# Patient Record
Sex: FEMALE | Race: WHITE | Employment: OTHER | URBAN - METROPOLITAN AREA
[De-identification: names, ages, dates, MRNs, and addresses within clinical notes are randomized per-mention and may not be internally consistent; named-entity substitution may affect disease eponyms.]

---

## 2017-06-20 ENCOUNTER — HOSPITAL ENCOUNTER (OUTPATIENT)
Dept: GENERAL RADIOLOGY | Age: 82
Discharge: HOME | End: 2017-06-20
Attending: INTERNAL MEDICINE | Admitting: INTERNAL MEDICINE

## 2017-06-20 ENCOUNTER — OFFICE VISIT (OUTPATIENT)
Age: 82
End: 2017-06-20

## 2017-06-20 VITALS
WEIGHT: 118.6 LBS | BODY MASS INDEX: 19.76 KG/M2 | DIASTOLIC BLOOD PRESSURE: 68 MMHG | SYSTOLIC BLOOD PRESSURE: 116 MMHG | HEIGHT: 65 IN

## 2017-06-20 DIAGNOSIS — M81.0 OSTEOPOROSIS, POSTMENOPAUSAL: ICD-10-CM

## 2017-06-20 DIAGNOSIS — M81.0 OSTEOPOROSIS, POSTMENOPAUSAL: Primary | ICD-10-CM

## 2017-06-20 DIAGNOSIS — Z51.81 MEDICATION MONITORING ENCOUNTER: ICD-10-CM

## 2017-06-20 PROCEDURE — 96372 THER/PROPH/DIAG INJ SC/IM: CPT | Performed by: INTERNAL MEDICINE

## 2017-06-20 PROCEDURE — 1090F PRES/ABSN URINE INCON ASSESS: CPT | Performed by: INTERNAL MEDICINE

## 2017-06-20 PROCEDURE — 99214 OFFICE O/P EST MOD 30 MIN: CPT | Performed by: INTERNAL MEDICINE

## 2017-06-20 PROCEDURE — G8427 DOCREV CUR MEDS BY ELIG CLIN: HCPCS | Performed by: INTERNAL MEDICINE

## 2017-06-20 PROCEDURE — G8399 PT W/DXA RESULTS DOCUMENT: HCPCS | Performed by: INTERNAL MEDICINE

## 2017-06-20 PROCEDURE — 77080 DXA BONE DENSITY AXIAL: CPT | Performed by: INTERNAL MEDICINE

## 2017-06-20 PROCEDURE — 1036F TOBACCO NON-USER: CPT | Performed by: INTERNAL MEDICINE

## 2017-06-20 PROCEDURE — 4005F PHARM THX FOR OP RXD: CPT | Performed by: INTERNAL MEDICINE

## 2017-06-20 PROCEDURE — 4040F PNEUMOC VAC/ADMIN/RCVD: CPT | Performed by: INTERNAL MEDICINE

## 2017-06-20 PROCEDURE — G8420 CALC BMI NORM PARAMETERS: HCPCS | Performed by: INTERNAL MEDICINE

## 2017-06-20 PROCEDURE — 1123F ACP DISCUSS/DSCN MKR DOCD: CPT | Performed by: INTERNAL MEDICINE

## 2017-06-26 ENCOUNTER — PROCEDURE VISIT (OUTPATIENT)
Age: 82
End: 2017-06-26

## 2017-06-26 DIAGNOSIS — M81.0 OSTEOPOROSIS, POSTMENOPAUSAL: Primary | ICD-10-CM

## 2017-07-11 ENCOUNTER — HOSPITAL ENCOUNTER (OUTPATIENT)
Dept: GENERAL RADIOLOGY | Age: 82
Discharge: HOME | End: 2017-07-11

## 2017-07-11 LAB
ALBUMIN SERPL-MCNC: 4.8 G/DL (ref 3.4–5)
ANION GAP SERPL CALCULATED.3IONS-SCNC: 11 MMOL/L (ref 3–16)
BUN BLDV-MCNC: 15 MG/DL (ref 7–20)
CALCIUM SERPL-MCNC: 9.2 MG/DL (ref 8.3–10.6)
CHLORIDE BLD-SCNC: 93 MMOL/L (ref 99–110)
CO2: 27 MMOL/L (ref 21–32)
CREAT SERPL-MCNC: <0.5 MG/DL (ref 0.6–1.2)
GFR AFRICAN AMERICAN: >60
GFR NON-AFRICAN AMERICAN: >60
GLUCOSE BLD-MCNC: 83 MG/DL (ref 70–99)
PHOSPHORUS: 3.8 MG/DL (ref 2.5–4.9)
POTASSIUM SERPL-SCNC: 4 MMOL/L (ref 3.5–5.1)
SODIUM BLD-SCNC: 131 MMOL/L (ref 136–145)
VITAMIN D 25-HYDROXY: 44.1 NG/ML

## 2017-12-15 DIAGNOSIS — M81.0 OSTEOPOROSIS, POSTMENOPAUSAL: Primary | ICD-10-CM

## 2017-12-21 ENCOUNTER — NURSE ONLY (OUTPATIENT)
Age: 82
End: 2017-12-21

## 2017-12-21 DIAGNOSIS — M81.0 POSTMENOPAUSAL OSTEOPOROSIS: Primary | ICD-10-CM

## 2017-12-21 PROCEDURE — 96372 THER/PROPH/DIAG INJ SC/IM: CPT | Performed by: INTERNAL MEDICINE

## 2018-06-20 DIAGNOSIS — M81.0 OSTEOPOROSIS, POSTMENOPAUSAL: Primary | ICD-10-CM

## 2018-06-27 ENCOUNTER — NURSE ONLY (OUTPATIENT)
Age: 83
End: 2018-06-27

## 2018-06-27 DIAGNOSIS — M81.0 OSTEOPOROSIS, POSTMENOPAUSAL: Primary | ICD-10-CM

## 2018-06-27 PROCEDURE — 96372 THER/PROPH/DIAG INJ SC/IM: CPT | Performed by: INTERNAL MEDICINE

## 2018-06-27 PROCEDURE — 99999 PR OFFICE/OUTPT VISIT,PROCEDURE ONLY: CPT | Performed by: INTERNAL MEDICINE

## 2019-01-14 ENCOUNTER — OFFICE VISIT (OUTPATIENT)
Dept: ENDOCRINOLOGY | Age: 84
End: 2019-01-14
Payer: MEDICARE

## 2019-01-14 ENCOUNTER — PROCEDURE VISIT (OUTPATIENT)
Dept: ENDOCRINOLOGY | Age: 84
End: 2019-01-14
Payer: MEDICARE

## 2019-01-14 ENCOUNTER — HOSPITAL ENCOUNTER (OUTPATIENT)
Dept: GENERAL RADIOLOGY | Age: 84
Discharge: HOME | End: 2019-01-14
Payer: MEDICARE

## 2019-01-14 VITALS
DIASTOLIC BLOOD PRESSURE: 71 MMHG | SYSTOLIC BLOOD PRESSURE: 136 MMHG | HEIGHT: 65 IN | BODY MASS INDEX: 19.56 KG/M2 | WEIGHT: 117.4 LBS

## 2019-01-14 DIAGNOSIS — M81.0 OSTEOPOROSIS, POSTMENOPAUSAL: Primary | ICD-10-CM

## 2019-01-14 DIAGNOSIS — M81.0 OSTEOPOROSIS, POSTMENOPAUSAL: ICD-10-CM

## 2019-01-14 DIAGNOSIS — Z51.81 MEDICATION MONITORING ENCOUNTER: ICD-10-CM

## 2019-01-14 PROCEDURE — G8399 PT W/DXA RESULTS DOCUMENT: HCPCS | Performed by: INTERNAL MEDICINE

## 2019-01-14 PROCEDURE — G8427 DOCREV CUR MEDS BY ELIG CLIN: HCPCS | Performed by: INTERNAL MEDICINE

## 2019-01-14 PROCEDURE — 1036F TOBACCO NON-USER: CPT | Performed by: INTERNAL MEDICINE

## 2019-01-14 PROCEDURE — 99214 OFFICE O/P EST MOD 30 MIN: CPT | Performed by: INTERNAL MEDICINE

## 2019-01-14 PROCEDURE — G8484 FLU IMMUNIZE NO ADMIN: HCPCS | Performed by: INTERNAL MEDICINE

## 2019-01-14 PROCEDURE — 4040F PNEUMOC VAC/ADMIN/RCVD: CPT | Performed by: INTERNAL MEDICINE

## 2019-01-14 PROCEDURE — G8420 CALC BMI NORM PARAMETERS: HCPCS | Performed by: INTERNAL MEDICINE

## 2019-01-14 PROCEDURE — 1123F ACP DISCUSS/DSCN MKR DOCD: CPT | Performed by: INTERNAL MEDICINE

## 2019-01-14 PROCEDURE — 77080 DXA BONE DENSITY AXIAL: CPT | Performed by: INTERNAL MEDICINE

## 2019-01-14 PROCEDURE — 96372 THER/PROPH/DIAG INJ SC/IM: CPT | Performed by: INTERNAL MEDICINE

## 2019-01-14 PROCEDURE — 1101F PT FALLS ASSESS-DOCD LE1/YR: CPT | Performed by: INTERNAL MEDICINE

## 2019-01-14 PROCEDURE — 1090F PRES/ABSN URINE INCON ASSESS: CPT | Performed by: INTERNAL MEDICINE

## 2019-01-14 PROCEDURE — 77080 DXA BONE DENSITY AXIAL: CPT

## 2019-06-04 ENCOUNTER — TELEPHONE (OUTPATIENT)
Dept: ENDOCRINOLOGY | Age: 84
End: 2019-06-04

## 2019-06-04 NOTE — TELEPHONE ENCOUNTER
Patient called to confirm that this office wanted to know her  date of birth.  Nurse explained to patient that his  was required so that the office could verify her insurance for the prolia coverage

## 2019-07-17 ENCOUNTER — NURSE ONLY (OUTPATIENT)
Dept: ENDOCRINOLOGY | Age: 84
End: 2019-07-17
Payer: MEDICARE

## 2019-07-17 DIAGNOSIS — M81.0 OSTEOPOROSIS, POSTMENOPAUSAL: ICD-10-CM

## 2019-07-17 PROCEDURE — 96372 THER/PROPH/DIAG INJ SC/IM: CPT | Performed by: INTERNAL MEDICINE

## 2020-01-21 ENCOUNTER — OFFICE VISIT (OUTPATIENT)
Dept: ENDOCRINOLOGY | Age: 85
End: 2020-01-21
Payer: MEDICARE

## 2020-01-21 ENCOUNTER — PROCEDURE VISIT (OUTPATIENT)
Dept: ENDOCRINOLOGY | Age: 85
End: 2020-01-21
Payer: MEDICARE

## 2020-01-21 ENCOUNTER — HOSPITAL ENCOUNTER (OUTPATIENT)
Dept: GENERAL RADIOLOGY | Age: 85
Discharge: HOME | End: 2020-01-21
Payer: MEDICARE

## 2020-01-21 VITALS
SYSTOLIC BLOOD PRESSURE: 121 MMHG | BODY MASS INDEX: 20.16 KG/M2 | WEIGHT: 121 LBS | HEIGHT: 65 IN | DIASTOLIC BLOOD PRESSURE: 69 MMHG

## 2020-01-21 DIAGNOSIS — M81.0 OSTEOPOROSIS, POSTMENOPAUSAL: ICD-10-CM

## 2020-01-21 LAB
ALBUMIN SERPL-MCNC: 4.5 G/DL (ref 3.4–5)
ANION GAP SERPL CALCULATED.3IONS-SCNC: 14 MMOL/L (ref 3–16)
BUN BLDV-MCNC: 11 MG/DL (ref 7–20)
CALCIUM SERPL-MCNC: 9.4 MG/DL (ref 8.3–10.6)
CHLORIDE BLD-SCNC: 94 MMOL/L (ref 99–110)
CO2: 26 MMOL/L (ref 21–32)
CREAT SERPL-MCNC: <0.5 MG/DL (ref 0.6–1.2)
GFR AFRICAN AMERICAN: >60
GFR NON-AFRICAN AMERICAN: >60
GLUCOSE BLD-MCNC: 83 MG/DL (ref 70–99)
PHOSPHORUS: 4.3 MG/DL (ref 2.5–4.9)
POTASSIUM SERPL-SCNC: 4.4 MMOL/L (ref 3.5–5.1)
SODIUM BLD-SCNC: 134 MMOL/L (ref 136–145)

## 2020-01-21 PROCEDURE — 96372 THER/PROPH/DIAG INJ SC/IM: CPT | Performed by: INTERNAL MEDICINE

## 2020-01-21 PROCEDURE — G8484 FLU IMMUNIZE NO ADMIN: HCPCS | Performed by: INTERNAL MEDICINE

## 2020-01-21 PROCEDURE — 99214 OFFICE O/P EST MOD 30 MIN: CPT | Performed by: INTERNAL MEDICINE

## 2020-01-21 PROCEDURE — G8427 DOCREV CUR MEDS BY ELIG CLIN: HCPCS | Performed by: INTERNAL MEDICINE

## 2020-01-21 PROCEDURE — 1123F ACP DISCUSS/DSCN MKR DOCD: CPT | Performed by: INTERNAL MEDICINE

## 2020-01-21 PROCEDURE — G8420 CALC BMI NORM PARAMETERS: HCPCS | Performed by: INTERNAL MEDICINE

## 2020-01-21 PROCEDURE — 1090F PRES/ABSN URINE INCON ASSESS: CPT | Performed by: INTERNAL MEDICINE

## 2020-01-21 PROCEDURE — 77080 DXA BONE DENSITY AXIAL: CPT

## 2020-01-21 PROCEDURE — 4040F PNEUMOC VAC/ADMIN/RCVD: CPT | Performed by: INTERNAL MEDICINE

## 2020-01-21 PROCEDURE — G8399 PT W/DXA RESULTS DOCUMENT: HCPCS | Performed by: INTERNAL MEDICINE

## 2020-01-21 PROCEDURE — 77080 DXA BONE DENSITY AXIAL: CPT | Performed by: INTERNAL MEDICINE

## 2020-01-21 PROCEDURE — 1036F TOBACCO NON-USER: CPT | Performed by: INTERNAL MEDICINE

## 2020-01-21 NOTE — PROGRESS NOTES
[facility] ([facility]) Osteoporosis and [address]  [address]  [address], [address]  Phone [phone]  Fax [phone]    NAME:  Ida Cortés  :  1935  CONSULT DATE:    2015  MOST RECENT VISIT:  2019  TODAYS DATE:  2020    Labs @ [facility] 2017     PROBLEMS. Normal BMD by DXA 2002, lowest T-score -0.9 in the spine (L1-L2)    BMD decreased 5222-5636-5972-2012, 7233-0122, lowest T-score -4.0 in the spine (L1-L2)  Natural menopause age 63 (uterus removed)  Back pain - surgery  relieved the pain  GERD, no symptoms, no meds (sleeps with head up)    CURRENT MANAGEMENT FOR OSTEOPOROSIS. Calcium, 300 mg from low calcium foods,  450 mg rice milk, 300 mg cheese, yogurt most days   diet MVI Ca+D other total    Calcium 1350+ 500-1000   8710-4598+ mg/d   Vitamin D  5098-9199   2728-6806 IU/d     25-OH D 44 ng/mL 2017 (desirable is 30-60 ng/mL)  Exercise, DVD for arthritis 1 hr each day  Pharmacologic therapy: Prolia 60 mg SQ twice yearly started 2015    PREVIOUS MEDICATIONS FOR OSTEOPOROSIS. Premarin off and on after menopause    OTHER CURRENT MEDICATIONS (SELECTED): clonazepam  OTC MEDICATIONS (SELECTED): Tylenol    CHIEF COMPLAINT. Here for f/u of osteoporosis, monitoring treatment. No new related signs or symptoms. INTERVAL HISTORY. See problem list for chronic/inactive conditions. She received Prolia without side effects. No falls, near-falls or fractures. Feels well overall. FOR FULL DETAILS OF FAMILY HISTORY, PAST MEDICAL AND SURGICAL HISTORY, SOCIAL HISTORY, AND REVIEW OF SYSTEMS, SEE PATIENT QUESTIONNAIRE OF TODAYS DATE. PHYSICAL EXAMINATION. GENERAL. Well-nourished, well-developed, normally proportioned adult. MENTAL STATUS. Pleasant mood. Oriented to time, place, and person. ORAL. Teeth appear to be in good condition. MUSCULOSKELETAL. Spinal contours are normal.  No spine tenderness to palpation or percussion.    Three for counseling and care coordination details. Cecil Kim MD, Director, [facility]) Osteoporosis and Bone Health Services    CC: Rehan Hung MD

## 2020-01-21 NOTE — RESULT ENCOUNTER NOTE
[facility]) Osteoporosis and [address] [place] [address]   Phone [phone]  Fax [phone]    NAME: Katie Menon   : 1935   STUDY DATE: 2020     REFERRING PROVIDER: Dennis España MD    INDICATION(S) FOR PERFORMING THE STUDY:  osteoporosis, age related (M81.0)    CLINICAL INFORMATION PROVIDED BY THE PATIENT: 82-year-old woman. Natural menopause was ae 62. No history of fragility fractures. No long-term corticosteroid use. Current treatment is Prolia started 2016. EQUIPMENT: Hologic Discovery. POSITIONING: Good. REGIONS OF INTEREST: Correct. ARTIFACTS: None. STUDY VALID? Yes. 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 T-scores   Initial study: 2002 L1-L2 -0.9 left fem. neck -0.7   Current study: 2020 L1-L2 -0.9 left fem. neck -1.9     The table below shows bone mineral density (grams/cm2), the appropriate measure for comparing serial scans. An increase or decrease is significant based on precision studies done at our center according to the ISCD protocol. PA spine Proximal Femur (left)   Date L1-L2 Fem. neck Trochanter Total hip   2002 0.880 0.773 0.703 0.992   2008 0.737 0.698 0.642 0.877   2012 0.691 0.662 0.559 0.813   2014 0.535 0.645 0.517 0.762   05/10/2016 0.772 0.601 0.544 0.745   2017 0.769 0.601 0.542 0.759   2019 0.833 0.625 0.557 0.747   2020 0.875 0.642 0.578 0.761     IMPRESSION:  BONE DENSITY IS LOW, CONSISTENT WITH OSTEOPOROSIS. SINCE THE PREVIOUS DXA, BMD DID INCREASED IN THE SPINE AND IS TRENDING UP IN THE LEFT HIP. Consider repeating this study in 1-2 years to assess the patient's progress. _________________________________________________   West Kim MD, Director, [facility] ([facility]) Osteoporosis and [address]

## 2020-07-23 ENCOUNTER — NURSE ONLY (OUTPATIENT)
Dept: ENDOCRINOLOGY | Age: 85
End: 2020-07-23
Payer: MEDICARE

## 2020-07-23 PROCEDURE — 96372 THER/PROPH/DIAG INJ SC/IM: CPT | Performed by: INTERNAL MEDICINE

## 2021-01-26 ENCOUNTER — OFFICE VISIT (OUTPATIENT)
Dept: ENDOCRINOLOGY | Age: 86
End: 2021-01-26
Payer: MEDICARE

## 2021-01-26 ENCOUNTER — PROCEDURE VISIT (OUTPATIENT)
Dept: ENDOCRINOLOGY | Age: 86
End: 2021-01-26

## 2021-01-26 ENCOUNTER — HOSPITAL ENCOUNTER (OUTPATIENT)
Dept: GENERAL RADIOLOGY | Age: 86
Discharge: HOME | End: 2021-01-26
Payer: MEDICARE

## 2021-01-26 VITALS
BODY MASS INDEX: 19.99 KG/M2 | DIASTOLIC BLOOD PRESSURE: 78 MMHG | HEIGHT: 65 IN | SYSTOLIC BLOOD PRESSURE: 138 MMHG | WEIGHT: 120 LBS

## 2021-01-26 DIAGNOSIS — Z51.81 MEDICATION MONITORING ENCOUNTER: ICD-10-CM

## 2021-01-26 DIAGNOSIS — M81.0 OSTEOPOROSIS, POSTMENOPAUSAL: Primary | ICD-10-CM

## 2021-01-26 DIAGNOSIS — M81.0 OSTEOPOROSIS, POSTMENOPAUSAL: ICD-10-CM

## 2021-01-26 PROCEDURE — 1036F TOBACCO NON-USER: CPT | Performed by: INTERNAL MEDICINE

## 2021-01-26 PROCEDURE — 77080 DXA BONE DENSITY AXIAL: CPT | Performed by: INTERNAL MEDICINE

## 2021-01-26 PROCEDURE — G8427 DOCREV CUR MEDS BY ELIG CLIN: HCPCS | Performed by: INTERNAL MEDICINE

## 2021-01-26 PROCEDURE — 1090F PRES/ABSN URINE INCON ASSESS: CPT | Performed by: INTERNAL MEDICINE

## 2021-01-26 PROCEDURE — 1123F ACP DISCUSS/DSCN MKR DOCD: CPT | Performed by: INTERNAL MEDICINE

## 2021-01-26 PROCEDURE — G8420 CALC BMI NORM PARAMETERS: HCPCS | Performed by: INTERNAL MEDICINE

## 2021-01-26 PROCEDURE — 4040F PNEUMOC VAC/ADMIN/RCVD: CPT | Performed by: INTERNAL MEDICINE

## 2021-01-26 PROCEDURE — 99215 OFFICE O/P EST HI 40 MIN: CPT | Performed by: INTERNAL MEDICINE

## 2021-01-26 PROCEDURE — 77080 DXA BONE DENSITY AXIAL: CPT

## 2021-01-26 PROCEDURE — G8399 PT W/DXA RESULTS DOCUMENT: HCPCS | Performed by: INTERNAL MEDICINE

## 2021-01-26 PROCEDURE — G8484 FLU IMMUNIZE NO ADMIN: HCPCS | Performed by: INTERNAL MEDICINE

## 2021-01-26 PROCEDURE — 96372 THER/PROPH/DIAG INJ SC/IM: CPT | Performed by: INTERNAL MEDICINE

## 2021-01-26 RX ORDER — PROPRANOLOL HYDROCHLORIDE 10 MG/1
10 TABLET ORAL EVERY 12 HOURS
COMMUNITY
Start: 2020-11-17

## 2021-01-26 RX ORDER — ASPIRIN 325 MG
325 TABLET ORAL DAILY
COMMUNITY

## 2021-01-26 NOTE — RESULT ENCOUNTER NOTE
[facility]) Osteoporosis and [address] [place] [address]., [address] [place] [address]   Phone [phone]  Fax [phone]    NAME: Davy Robertson   : 1935   STUDY DATE: 2021     REFERRING PROVIDER: Kelley Hernandez MD    INDICATION(S) FOR PERFORMING THE STUDY:  osteoporosis, age related (M81.0)    CLINICAL INFORMATION PROVIDED BY THE PATIENT: 21-year-old woman. Natural menopause was ae 62. No history of fragility fractures. No long-term corticosteroid use. Current treatment is Prolia started 2016. EQUIPMENT: Hologic Discovery. POSITIONING: Good. REGIONS OF INTEREST: Correct. ARTIFACTS: None. STUDY VALID? Yes. 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 T-scores   Initial study: 2002 L1-L2 -0.9 left fem. neck -0.7   Current study: 2021 L1-L2 -0.7 left fem. neck -1.9     The table below shows bone mineral density (grams/cm2), the appropriate measure for comparing serial scans. An increase or decrease is significant based on precision studies done at our center according to the ISCD protocol. PA spine Proximal Femur (left)   Date L1-L2 Fem. neck Trochanter Total hip   2002 0.880 0.773 0.703 0.992   2008 0.737 0.698 0.642 0.877   2012 0.691 0.662 0.559 0.813   2014 0.535 0.645 0.517 0.762   05/10/2016 0.772 0.601 0.544 0.745   2017 0.769 0.601 0.542 0.759   2019 0.833 0.625 0.557 0.747   2020 0.875 0.642 0.578 0.761   2021 0.901 0.634 0.562 0.742     IMPRESSION:  BONE DENSITY IS LOW, CONSISTENT WITH OSTEOPOROSIS. SINCE THE LAST DXA, BMD DID NOT CHANGE SIGNIFICANTLY IN THE SPINE OR LEFT HIP.  COMPARED WITH 2014, BEFORE STARTING PROLIA, BMD IS HIGHER NOW IN THE SPINE AND TROCHANTER. Consider repeating this study in 1-2 years to assess the patient's progress. _________________________________________________   Johnny Walker MD,

## 2021-01-26 NOTE — PROGRESS NOTES
[facility] ([facility]) Osteoporosis and [address]  [address]  [place], [address]  Phone [phone]  Fax [phone]    NAME:  Poncho Oliveira  :  1935  CONSULT DATE:    2015  MOST RECENT VISIT:  2020  TODAYS DATE:  2021    Labs @ [facility] 2020    PROBLEMS. Normal BMD by DXA 2002, lowest T-score -0.9 in the spine (L1-L2)    BMD decreased 8864-5640-2843-2012, 1095-2109, lowest T-score -4.0 in the spine (L1-L2)  Natural menopause age 63 (uterus removed)  Back pain - surgery  relieved the pain  GERD, no symptoms, no meds (sleeps with head up)  2020 syncope    CURRENT MANAGEMENT FOR OSTEOPOROSIS. Calcium, 300 mg from low calcium foods,  450 mg rice milk, 300 mg cheese, yogurt most days   diet MVI Ca+D other total    Calcium 1350+ 500-1000   2876-2650+ mg/d   Vitamin D  7641-0410   5444-7644 IU/d     25-OH D 44 ng/mL 2017 (desirable is 30-60 ng/mL)  Exercise, DVD for arthritis 1 hr each day  Pharmacologic therapy: Prolia 60 mg SQ twice yearly started 2015    PREVIOUS MEDICATIONS FOR OSTEOPOROSIS. Premarin off and on after menopause    OTHER CURRENT MEDICATIONS (SELECTED): clonazepam, propranolol  OTC MEDICATIONS (SELECTED): Tylenol    CHIEF COMPLAINT. Here for f/u of osteoporosis, monitoring treatment. No new related signs or symptoms. INTERVAL HISTORY. See problem list for chronic/inactive conditions. She received Prolia without side effects. No falls, near-falls or fractures. 2020 she blacked out, was hospitalized with no cause found. She was given metoprolol which was too strong then changed to propranolol which she continues to take. FOR FULL DETAILS OF FAMILY HISTORY, PAST MEDICAL AND SURGICAL HISTORY, SOCIAL HISTORY, AND REVIEW OF SYSTEMS, SEE PATIENT QUESTIONNAIRE OF TODAYS DATE. PHYSICAL EXAMINATION. GENERAL. Well-nourished, well-developed, normally proportioned adult. MENTAL STATUS. Pleasant mood.   Oriented to

## 2021-07-29 ENCOUNTER — NURSE ONLY (OUTPATIENT)
Dept: ENDOCRINOLOGY | Age: 86
End: 2021-07-29
Payer: MEDICARE

## 2021-07-29 DIAGNOSIS — M81.0 OSTEOPOROSIS, POSTMENOPAUSAL: ICD-10-CM

## 2021-07-29 PROCEDURE — 96372 THER/PROPH/DIAG INJ SC/IM: CPT | Performed by: INTERNAL MEDICINE

## 2022-02-21 ENCOUNTER — OFFICE VISIT (OUTPATIENT)
Dept: ENDOCRINOLOGY | Age: 87
End: 2022-02-21
Payer: MEDICARE

## 2022-02-21 ENCOUNTER — HOSPITAL ENCOUNTER (OUTPATIENT)
Dept: GENERAL RADIOLOGY | Age: 87
Discharge: HOME | End: 2022-02-21
Payer: MEDICARE

## 2022-02-21 VITALS
BODY MASS INDEX: 20.46 KG/M2 | DIASTOLIC BLOOD PRESSURE: 68 MMHG | WEIGHT: 122.8 LBS | SYSTOLIC BLOOD PRESSURE: 120 MMHG | HEIGHT: 65 IN

## 2022-02-21 DIAGNOSIS — Z51.81 MEDICATION MONITORING ENCOUNTER: ICD-10-CM

## 2022-02-21 DIAGNOSIS — M81.0 OSTEOPOROSIS, POSTMENOPAUSAL: ICD-10-CM

## 2022-02-21 DIAGNOSIS — M81.0 OSTEOPOROSIS, POSTMENOPAUSAL: Primary | ICD-10-CM

## 2022-02-21 PROCEDURE — 99214 OFFICE O/P EST MOD 30 MIN: CPT | Performed by: INTERNAL MEDICINE

## 2022-02-21 PROCEDURE — 77080 DXA BONE DENSITY AXIAL: CPT | Performed by: INTERNAL MEDICINE

## 2022-02-21 PROCEDURE — G8399 PT W/DXA RESULTS DOCUMENT: HCPCS | Performed by: INTERNAL MEDICINE

## 2022-02-21 PROCEDURE — 1090F PRES/ABSN URINE INCON ASSESS: CPT | Performed by: INTERNAL MEDICINE

## 2022-02-21 PROCEDURE — G8420 CALC BMI NORM PARAMETERS: HCPCS | Performed by: INTERNAL MEDICINE

## 2022-02-21 PROCEDURE — 1123F ACP DISCUSS/DSCN MKR DOCD: CPT | Performed by: INTERNAL MEDICINE

## 2022-02-21 PROCEDURE — 1036F TOBACCO NON-USER: CPT | Performed by: INTERNAL MEDICINE

## 2022-02-21 PROCEDURE — G8427 DOCREV CUR MEDS BY ELIG CLIN: HCPCS | Performed by: INTERNAL MEDICINE

## 2022-02-21 PROCEDURE — G8484 FLU IMMUNIZE NO ADMIN: HCPCS | Performed by: INTERNAL MEDICINE

## 2022-02-21 PROCEDURE — 96372 THER/PROPH/DIAG INJ SC/IM: CPT | Performed by: INTERNAL MEDICINE

## 2022-02-21 PROCEDURE — 77080 DXA BONE DENSITY AXIAL: CPT

## 2022-02-21 PROCEDURE — 4040F PNEUMOC VAC/ADMIN/RCVD: CPT | Performed by: INTERNAL MEDICINE

## 2022-02-21 RX ORDER — MECLIZINE HYDROCHLORIDE 25 MG/1
TABLET ORAL
COMMUNITY
Start: 2021-05-04

## 2022-02-21 RX ORDER — DESONIDE 0.5 MG/G
CREAM TOPICAL
COMMUNITY
Start: 2021-04-05

## 2022-02-21 RX ORDER — IBUPROFEN 600 MG/1
TABLET ORAL
COMMUNITY
Start: 2021-03-18

## 2022-02-21 NOTE — PROGRESS NOTES
[facility] ([facility]) Osteoporosis and [address]  [address]  [place], [address]  Phone [phone]  Fax [phone]    NAME:  Jl Nguyen  :  1935  CONSULT DATE:    2015  MOST RECENT VISIT:  2021  TODAYS DATE:  2022    Labs @ [facility] 2020    PROBLEMS. Normal BMD by DXA 2002, lowest T-score -0.9 in the spine (L1-L2)    BMD decreased 4966-0469-0523-2012, 2602-7180, lowest T-score -4.0 in the spine (L1-L2)  Natural menopause age 63 (uterus removed)  Back pain - surgery  relieved the pain  GERD, no symptoms, no meds (sleeps with head up)  2020 syncope    CURRENT MANAGEMENT FOR OSTEOPOROSIS. Calcium, 300 mg from low calcium foods,  450 mg rice milk, 300 mg cheese, yogurt most days   diet MVI Ca+D other total    Calcium 1350+ 500-1000   4847-0591+ mg/d   Vitamin D  7229-1295   8708-6190 IU/d     25-OH D 44 ng/mL 2017 (desirable is 30-60 ng/mL)  Exercise, DVD for arthritis 1 hr each day  Pharmacologic therapy: Prolia 60 mg SQ twice yearly started 2015    PREVIOUS MEDICATIONS FOR OSTEOPOROSIS. Premarin off and on after menopause    OTHER CURRENT MEDICATIONS (SELECTED): clonazepam, propranolol  OTC MEDICATIONS (SELECTED): Tylenol    CHIEF COMPLAINT. Here for f/u of osteoporosis, monitoring treatment. No new related signs or symptoms. INTERVAL HISTORY. See problem list for chronic/inactive conditions. She received Prolia without side effects. No falls, near-falls or fractures. Feels well overall. FOR FULL DETAILS OF FAMILY HISTORY, PAST MEDICAL AND SURGICAL HISTORY, SOCIAL HISTORY, AND REVIEW OF SYSTEMS, SEE PATIENT QUESTIONNAIRE OF TODAYS DATE. PHYSICAL EXAMINATION. GENERAL. Well-nourished, well-developed, normally proportioned adult. MENTAL STATUS. Pleasant mood. Oriented to time, place, and person. ORAL. Teeth appear to be in good condition. MUSCULOSKELETAL.  Spinal contours are normal.  No spine tenderness to palpation or percussion. Three finger spaces between ribs and pelvis. Gait steady without assistance. NEUROLOGICAL. Able to rise from chair without using arms. No apparent motor or sensory deficit. Coordination appears normal       BONE DENSITY. Most recent done here using Hologic equipment. T-scores   Initial study: 07/12/2002 L1-L2 -0.9   left fem. neck -0.7   Current study: 02/21/2022 L1-L2 -0.3 L1 -2.6 left fem. neck -1.7     The table below shows bone mineral density (grams/cm2), the appropriate measure for comparing serial scans. An increase or decrease is significant based on precision studies done at our center according to the ISCD protocol. 61     PA spine Proximal Femur (left)   Date L1-L2 L1 Fem. neck Trochanter Total hip   07/12/2002 0.880 --- 0.773 0.703 0.992   06/18/2008 0.737 --- 0.698 0.642 0.877   02/16/2012 0.691 --- 0.662 0.559 0.813   11/25/2014 0.535 --- 0.645 0.517 0.762   05/10/2016 0.772 0.700 0.601 0.544 0.745   01/14/2019 0.833 0.692 0.625 0.557 0.747   01/21/2020 0.875 0.737 0.642 0.578 0.761   01/26/2021 0.901 0.763 0.634 0.562 0.742   02/21/2022 0.949 0.834 0.661 0.565 0.744     IMPRESSION:  BONE DENSITY IS LOW, CONSISTENT WITH OSTEOPOROSIS. SINCE THE LAST DXA, BMD INCREASED IN L1+L2 (AND L1 ALONE) AND IS TRENDING UP IN THE FEMORAL NECK. DID NOT CHANGE SIGNIFICANTLY IN THE SPINE OR LEFT HIP.  COMPARED WITH 2014, BEFORE STARTING PROLIA, BMD IS HIGHER NOW IN THE SPINE AND TROCHANTER. Labs: 01/2015 CBC TSH Ca 9.3 Cr 0.6. 05/2015 UCa 76 (OK). 07/2017 Ca 9.2 Cr 0.5. 01/2020 Ca 9.4 Cr 0.5.  09/2020 Ca 10.3 Cr 0.7. X-rays viewed: DXA printouts reviewed. Radiology reports: 10/24/2014, pelvis x-ray, no fracture. 10/24/2014, LS x-ray, no fractures. 10/22/2014, LS MRI, no fractures. 10/2017 CXR. ASSESSMENT. Osteoporosis, bone density lower than desirable and decreasing dramatically. Without effective treatment she is at high risk of fracture.   She is doing well with Prolia started 05/2015. PLANS. OK to give Prolia today and continue Prolia 60 mg SQ twice yearly. We discussed long-term treatment results. She was concerned that a fracture might not heal if one occurred due to the effects of treatment. I reassured her that was not the case. Return appointment with DXA in 1 year. Time spent today: 30-39 minutes. Karie Kim MD, Director, [facility]) Osteoporosis and Bone Health Services    CC: Mirta Le MD

## 2022-02-21 NOTE — RESULT ENCOUNTER NOTE
[facility]) Osteoporosis and [address]  [employer] [address] [facility], [address] [place] [address],[place] [address]  Phone [phone]  Fax [phone]    NAME: Zana Pineda   : 1935   STUDY DATE: 2022     REFERRING PROVIDER: Dona Hollins MD    INDICATION(S) FOR PERFORMING THE STUDY:  osteoporosis, age related (M81.0)    CLINICAL INFORMATION PROVIDED BY THE PATIENT: 37-year-old woman. Natural menopause was ae 62. No history of fragility fractures. No long-term corticosteroid use. Current treatment is Prolia started 2016. EQUIPMENT: Hologic Discovery. POSITIONING: Good. REGIONS OF INTEREST: Correct. ARTIFACTS: None. STUDY VALID? Yes. Spine BMD is spuriously high because of generalized degenerative change;  L3 and L4 were deleted prior to . Only L1 is valid since ; caution should be used when looking at a single vertebra. T-scores   Initial study: 2002 L1-L2 -0.9   left fem. neck -0.7   Current study: 2022 L1-L2 -0.3 L1 -2.6 left fem. neck -1.7     The table below shows bone mineral density (grams/cm2), the appropriate measure for comparing serial scans. An increase or decrease is significant based on precision studies done at our center according to the ISCD protocol. 61     PA spine Proximal Femur (left)   Date L1-L2 L1 Fem. neck Trochanter Total hip   2002 0.880 --- 0.773 0.703 0.992   2008 0.737 --- 0.698 0.642 0.877   2012 0.691 --- 0.662 0.559 0.813   2014 0.535 --- 0.645 0.517 0.762   05/10/2016 0.772 0.700 0.601 0.544 0.745   2019 0.833 0.692 0.625 0.557 0.747   2020 0.875 0.737 0.642 0.578 0.761   2021 0.901 0.763 0.634 0.562 0.742   2022 0.949 0.834 0.661 0.565 0.744     IMPRESSION:  BONE DENSITY IS LOW, CONSISTENT WITH OSTEOPOROSIS. SINCE THE LAST DXA, BMD INCREASED IN L1+L2 (AND L1 ALONE) AND IS TRENDING UP IN THE FEMORAL NECK.  DID NOT CHANGE SIGNIFICANTLY IN THE SPINE OR LEFT HIP.  COMPARED WITH , BEFORE STARTING PROLIA, BMD IS HIGHER NOW IN THE SPINE AND TROCHANTER. Consider repeating this study in 1-2 years to assess the patient's progress. _________________________________________________   Karie Kim MD, Director, [facility]) Osteoporosis and [address] [place] [address]

## 2022-02-22 LAB — VITAMIN D 25-HYDROXY: 34.3 NG/ML

## 2022-08-25 ENCOUNTER — NURSE ONLY (OUTPATIENT)
Dept: ENDOCRINOLOGY | Age: 87
End: 2022-08-25
Payer: MEDICARE

## 2022-08-25 DIAGNOSIS — M81.0 OSTEOPOROSIS, POSTMENOPAUSAL: Primary | ICD-10-CM

## 2022-08-25 PROCEDURE — 96372 THER/PROPH/DIAG INJ SC/IM: CPT | Performed by: INTERNAL MEDICINE

## 2023-03-14 ENCOUNTER — PROCEDURE VISIT (OUTPATIENT)
Dept: ENDOCRINOLOGY | Age: 88
End: 2023-03-14

## 2023-03-14 ENCOUNTER — OFFICE VISIT (OUTPATIENT)
Dept: ENDOCRINOLOGY | Age: 88
End: 2023-03-14
Payer: MEDICARE

## 2023-03-14 ENCOUNTER — HOSPITAL ENCOUNTER (OUTPATIENT)
Dept: GENERAL RADIOLOGY | Age: 88
Discharge: HOME | End: 2023-03-14
Payer: MEDICARE

## 2023-03-14 VITALS
BODY MASS INDEX: 21.1 KG/M2 | DIASTOLIC BLOOD PRESSURE: 69 MMHG | WEIGHT: 123.6 LBS | HEIGHT: 64 IN | SYSTOLIC BLOOD PRESSURE: 135 MMHG

## 2023-03-14 DIAGNOSIS — Z51.81 MEDICATION MONITORING ENCOUNTER: ICD-10-CM

## 2023-03-14 DIAGNOSIS — M81.0 OSTEOPOROSIS, POSTMENOPAUSAL: Primary | ICD-10-CM

## 2023-03-14 DIAGNOSIS — M81.0 OSTEOPOROSIS, POSTMENOPAUSAL: ICD-10-CM

## 2023-03-14 PROCEDURE — 99214 OFFICE O/P EST MOD 30 MIN: CPT | Performed by: INTERNAL MEDICINE

## 2023-03-14 PROCEDURE — 96372 THER/PROPH/DIAG INJ SC/IM: CPT | Performed by: INTERNAL MEDICINE

## 2023-03-14 PROCEDURE — G8484 FLU IMMUNIZE NO ADMIN: HCPCS | Performed by: INTERNAL MEDICINE

## 2023-03-14 PROCEDURE — G8420 CALC BMI NORM PARAMETERS: HCPCS | Performed by: INTERNAL MEDICINE

## 2023-03-14 PROCEDURE — 1036F TOBACCO NON-USER: CPT | Performed by: INTERNAL MEDICINE

## 2023-03-14 PROCEDURE — 1090F PRES/ABSN URINE INCON ASSESS: CPT | Performed by: INTERNAL MEDICINE

## 2023-03-14 PROCEDURE — 1123F ACP DISCUSS/DSCN MKR DOCD: CPT | Performed by: INTERNAL MEDICINE

## 2023-03-14 PROCEDURE — G8427 DOCREV CUR MEDS BY ELIG CLIN: HCPCS | Performed by: INTERNAL MEDICINE

## 2023-03-14 PROCEDURE — 77080 DXA BONE DENSITY AXIAL: CPT

## 2023-03-14 PROCEDURE — 77080 DXA BONE DENSITY AXIAL: CPT | Performed by: INTERNAL MEDICINE

## 2023-03-14 PROCEDURE — G8399 PT W/DXA RESULTS DOCUMENT: HCPCS | Performed by: INTERNAL MEDICINE

## 2023-03-14 NOTE — RESULT ENCOUNTER NOTE
[facility]) Osteoporosis and [address] [facility], [address] [place] [address],[place] [address]  Phone [phone]  Fax [phone]    NAME: Smith Rowley   : 1935   STUDY DATE: 2023     REFERRING PROVIDER: Diana De Luna MD    INDICATION(S) FOR PERFORMING THE STUDY:  osteoporosis, age related (M81.0)    CLINICAL INFORMATION PROVIDED BY THE PATIENT: 82-year-old woman. Natural menopause was ae 62. No history of fragility fractures. No long-term corticosteroid use. Current treatment is Prolia started 2016. EQUIPMENT: Hologic Discovery. POSITIONING: Good. REGIONS OF INTEREST: Correct. ARTIFACTS: None. STUDY VALID? Yes. Spine BMD is spuriously high because of generalized degenerative change;  L3 and L4 were deleted prior to . Only L1 is valid since ; caution should be used when looking at a single vertebra. T-scores   Initial study: 2002 L1-L2 -0.9   left fem. neck -0.7   Current study: 2023 L1-L2 -0.3 L1 -2.6 left fem. neck -1.7     The table below shows bone mineral density (grams/cm2), the appropriate measure for comparing serial scans. An increase or decrease is significant based on precision studies done at our center according to the ISCD protocol. 61     PA spine Proximal Femur (left)   Date L1-L2 L1 Fem. neck Trochanter Total hip   2002 0.880 --- 0.773 0.703 0.992   2008 0.737 --- 0.698 0.642 0.877   2012 0.691 --- 0.662 0.559 0.813   2014 0.535 --- 0.645 0.517 0.762   05/10/2016 0.772 0.700 0.601 0.544 0.745   2019 0.833 0.692 0.625 0.557 0.747   2020 0.875 0.737 0.642 0.578 0.761   2021 0.901 0.763 0.634 0.562 0.742   2022 0.949 0.834 0.661 0.565 0.744   2023 0.916 0.826 0.651 0.548 0.728     IMPRESSION:  BONE DENSITY IS LOW, CONSISTENT WITH OSTEOPOROSIS.   SINCE THE LAST DXA, BMD DID NOT CHANGE SIGNIFICANTLY IN THE SPINE OR LEFT HIP.   COMPARED WITH , BEFORE STARTING PROLIA, BMD IS HIGHER NOW IN THE SPINE AND TROCHANTER. Consider repeating this study in 1-2 years to assess the patient's progress. _________________________________________________   Chapo Kim MD, Director, [facility]) Osteoporosis and [address] [place] [address]

## 2023-03-14 NOTE — PROGRESS NOTES
[facility] ([facility]) Osteoporosis and [address]  [place] [address] [facility] [address], [address]  Phone [phone]  Fax [phone]    NAME:  Rosa Mayfield  :  1935  CONSULT DATE:    2015  MOST RECENT VISIT:  2022  TODAY'S DATE:  2023    Labs @ [facility] 2022    PROBLEMS. Normal BMD by DXA 2002, lowest T-score -0.9 in the spine (L1-L2)    BMD decreased 3025-6208-4540-2012, 5977-6011, lowest T-score -4.0 in the spine (L1-L2)  Natural menopause age 63 (uterus removed)  Back pain - surgery  relieved the pain  GERD, no symptoms, no meds (sleeps with head up)  2020 syncope    CURRENT MANAGEMENT FOR OSTEOPOROSIS. Calcium, 300 mg from low calcium foods,  450 mg rice milk, 300 mg cheese, yogurt most days   diet MVI Ca+D other total    Calcium 1350+ 500-1000   7986-0329+ mg/d   Vitamin D  4571-3914   1461-9006 IU/d     25-OH D 34 ng/mL 2022 (desirable is 30-60 ng/mL)  Exercise, DVD for arthritis 1 hr each day  Pharmacologic therapy: Prolia 60 mg SQ twice yearly started 2015    PREVIOUS MEDICATIONS FOR OSTEOPOROSIS. Premarin off and on after menopause    OTHER CURRENT MEDICATIONS (SELECTED): clonazepam, propranolol  OTC MEDICATIONS (SELECTED): Tylenol    CHIEF COMPLAINT. Here for f/u of osteoporosis, monitoring treatment. No new related signs or symptoms. INTERVAL HISTORY. See problem list for chronic/inactive conditions. She received Prolia without side effects. No falls, near-falls or fractures. Feels well overall. FOR FULL DETAILS OF FAMILY HISTORY, PAST MEDICAL AND SURGICAL HISTORY, SOCIAL HISTORY, AND REVIEW OF SYSTEMS, SEE PATIENT QUESTIONNAIRE OF TODAY'S DATE. PHYSICAL EXAMINATION. GENERAL. Well-nourished, well-developed, normally proportioned adult. MENTAL STATUS. Pleasant mood. Oriented to time, place, and person. ORAL. Teeth appear to be in good condition. MUSCULOSKELETAL.  Spinal contours are normal.  No spine tenderness to palpation or percussion. Three finger spaces between ribs and pelvis. Gait steady without assistance. NEUROLOGICAL. Able to rise from chair without using arms. No apparent motor or sensory deficit. Coordination appears normal         BONE DENSITY. Most recent done here using Hologic equipment. T-scores   Initial study: 07/12/2002 L1-L2 -0.9   left fem. neck -0.7   Current study: 03/14/2023 L1-L2 -0.3 L1 -2.6 left fem. neck -1.7     The table below shows bone mineral density (grams/cm2), the appropriate measure for comparing serial scans. An increase or decrease is significant based on precision studies done at our center according to the ISCD protocol. 61     PA spine Proximal Femur (left)   Date L1-L2 L1 Fem. neck Trochanter Total hip   07/12/2002 0.880 --- 0.773 0.703 0.992   06/18/2008 0.737 --- 0.698 0.642 0.877   02/16/2012 0.691 --- 0.662 0.559 0.813   11/25/2014 0.535 --- 0.645 0.517 0.762   05/10/2016 0.772 0.700 0.601 0.544 0.745   01/14/2019 0.833 0.692 0.625 0.557 0.747   01/21/2020 0.875 0.737 0.642 0.578 0.761   01/26/2021 0.901 0.763 0.634 0.562 0.742   02/21/2022 0.949 0.834 0.661 0.565 0.744   03/14/2023 0.916 0.826 0.651 0.548 0.728     IMPRESSION:  BONE DENSITY IS LOW, CONSISTENT WITH OSTEOPOROSIS. SINCE THE LAST DXA, BMD DID NOT CHANGE SIGNIFICANTLY IN THE SPINE OR LEFT HIP.   COMPARED WITH 2014, BEFORE STARTING PROLIA, BMD IS HIGHER NOW IN THE SPINE AND TROCHANTER. Labs: 01/2015 CBC TSH Ca 9.3 Cr 0.6. 05/2015 UCa 76 (OK). 07/2017 Ca 9.2 Cr 0.5. 01/2020 Ca 9.4 Cr 0.5.  09/2020 Ca 10.3 Cr 0.7. 11/2022 Ca 9.4 Cr 0.7. X-rays viewed: DXA printouts reviewed. Radiology reports: 10/24/2014, pelvis x-ray, no fracture. 10/24/2014, LS x-ray, no fractures. 10/22/2014, LS MRI, no fractures. 10/2017 CXR. ASSESSMENT. Osteoporosis, bone density lower than desirable and decreasing dramatically. Without effective treatment she is at high risk of fracture.   She is doing well with Prolia started 05/2015. PLANS. OK to give Prolia today and continue Prolia 60 mg SQ twice yearly. Return appointment with DXA in 1 year. Time spent today: 30-39 minutes. Mich Kim MD, Director, [facility]) Osteoporosis and Bone Health Services    CC: Kesha Swenson MD

## 2023-09-28 ENCOUNTER — NURSE ONLY (OUTPATIENT)
Dept: ENDOCRINOLOGY | Age: 88
End: 2023-09-28

## 2023-09-28 DIAGNOSIS — M81.0 OSTEOPOROSIS, POSTMENOPAUSAL: Primary | ICD-10-CM

## 2023-09-28 NOTE — PROGRESS NOTES
Prolia supplied by the physician office. Informed patient if any signs of redness,rash,swelling or unusual symptoms occur, please contact the office. Prolia given per physician order. It is the patient's responsibility to notify the physician office of new insurance plan or new identification number prior to appointment to prevent delay in treatment. Please send a copy of the front and back of the insurance card either by fax, mail or stop by the office.
Detail Level: Detailed

## 2024-04-03 ENCOUNTER — TELEPHONE (OUTPATIENT)
Dept: ENDOCRINOLOGY | Age: 89
End: 2024-04-03

## 2024-04-03 DIAGNOSIS — M81.0 OSTEOPOROSIS, POSTMENOPAUSAL: Primary | ICD-10-CM

## 2024-04-03 NOTE — TELEPHONE ENCOUNTER
I was looking at the schedule and saw pt was scheduled 4-4-24 for prolia and then 4-29-24 for dexa. I combined them all in one visit tomorrow FYJUSTIN

## 2024-04-04 ENCOUNTER — HOSPITAL ENCOUNTER (OUTPATIENT)
Dept: GENERAL RADIOLOGY | Age: 89
Discharge: HOME | End: 2024-04-04
Payer: MEDICARE

## 2024-04-04 ENCOUNTER — OFFICE VISIT (OUTPATIENT)
Dept: ENDOCRINOLOGY | Age: 89
End: 2024-04-04
Payer: MEDICARE

## 2024-04-04 VITALS
DIASTOLIC BLOOD PRESSURE: 98 MMHG | WEIGHT: 126.4 LBS | RESPIRATION RATE: 16 BRPM | BODY MASS INDEX: 21.58 KG/M2 | SYSTOLIC BLOOD PRESSURE: 153 MMHG | HEART RATE: 70 BPM | HEIGHT: 64 IN

## 2024-04-04 DIAGNOSIS — M81.0 OSTEOPOROSIS, POSTMENOPAUSAL: ICD-10-CM

## 2024-04-04 DIAGNOSIS — M81.0 OSTEOPOROSIS, POSTMENOPAUSAL: Primary | ICD-10-CM

## 2024-04-04 PROCEDURE — 77080 DXA BONE DENSITY AXIAL: CPT | Performed by: INTERNAL MEDICINE

## 2024-04-04 PROCEDURE — G8427 DOCREV CUR MEDS BY ELIG CLIN: HCPCS | Performed by: INTERNAL MEDICINE

## 2024-04-04 PROCEDURE — 1090F PRES/ABSN URINE INCON ASSESS: CPT | Performed by: INTERNAL MEDICINE

## 2024-04-04 PROCEDURE — 96372 THER/PROPH/DIAG INJ SC/IM: CPT | Performed by: INTERNAL MEDICINE

## 2024-04-04 PROCEDURE — G8420 CALC BMI NORM PARAMETERS: HCPCS | Performed by: INTERNAL MEDICINE

## 2024-04-04 PROCEDURE — 99214 OFFICE O/P EST MOD 30 MIN: CPT | Performed by: INTERNAL MEDICINE

## 2024-04-04 PROCEDURE — 1036F TOBACCO NON-USER: CPT | Performed by: INTERNAL MEDICINE

## 2024-04-04 PROCEDURE — 77080 DXA BONE DENSITY AXIAL: CPT

## 2024-04-04 PROCEDURE — 1123F ACP DISCUSS/DSCN MKR DOCD: CPT | Performed by: INTERNAL MEDICINE

## 2024-04-04 NOTE — PROGRESS NOTES
Informed patient if any signs of redness,rash,swelling or unusual symptoms occur, please contact the office. Prolia given per physician order.  
continue treatment with Prolia 60 mg SQ twice yearly.    Return appointment with DXA in 1 year.  Time spent today: 30-39 minutes.    Juni Kim MD, Director, [facility] Osteoporosis and Bone Health Services    CC: SAHIL Kimball MD

## 2024-09-25 ENCOUNTER — TELEPHONE (OUTPATIENT)
Dept: ENDOCRINOLOGY | Age: 89
End: 2024-09-25

## 2024-10-10 ENCOUNTER — NURSE ONLY (OUTPATIENT)
Dept: ENDOCRINOLOGY | Age: 89
End: 2024-10-10
Payer: MEDICARE

## 2024-10-10 DIAGNOSIS — M81.0 OSTEOPOROSIS, POSTMENOPAUSAL: Primary | ICD-10-CM

## 2024-10-10 PROCEDURE — 96372 THER/PROPH/DIAG INJ SC/IM: CPT | Performed by: INTERNAL MEDICINE

## 2025-03-21 ENCOUNTER — TELEPHONE (OUTPATIENT)
Dept: ENDOCRINOLOGY | Age: 89
End: 2025-03-21

## 2025-04-16 ENCOUNTER — TELEPHONE (OUTPATIENT)
Dept: ENDOCRINOLOGY | Age: 89
End: 2025-04-16

## 2025-04-16 NOTE — TELEPHONE ENCOUNTER
Pt called to cancel her appt because she does not drive and need to get transportation provided from her insurance to pay    Canceled appt 4/21/25 @ 1040 am-Dexa                           4/21/25 @ 11am appt w/ Dr Julio Ray      Rescheduled  5/5/25  @ 240 pm  -Dexa                          5/5/25 @ 3pm appt w/ Dr Julio ray

## 2025-05-05 ENCOUNTER — OFFICE VISIT (OUTPATIENT)
Dept: ENDOCRINOLOGY | Age: 89
End: 2025-05-05
Payer: MEDICARE

## 2025-05-05 ENCOUNTER — HOSPITAL ENCOUNTER (OUTPATIENT)
Dept: GENERAL RADIOLOGY | Age: 89
Discharge: HOME | End: 2025-05-05
Payer: MEDICARE

## 2025-05-05 VITALS — HEIGHT: 64 IN | BODY MASS INDEX: 21.17 KG/M2 | WEIGHT: 124 LBS

## 2025-05-05 DIAGNOSIS — M81.0 OSTEOPOROSIS, POSTMENOPAUSAL: Primary | ICD-10-CM

## 2025-05-05 DIAGNOSIS — M81.0 OSTEOPOROSIS, POSTMENOPAUSAL: ICD-10-CM

## 2025-05-05 DIAGNOSIS — Z51.81 MEDICATION MONITORING ENCOUNTER: ICD-10-CM

## 2025-05-05 PROCEDURE — G8427 DOCREV CUR MEDS BY ELIG CLIN: HCPCS | Performed by: INTERNAL MEDICINE

## 2025-05-05 PROCEDURE — 1090F PRES/ABSN URINE INCON ASSESS: CPT | Performed by: INTERNAL MEDICINE

## 2025-05-05 PROCEDURE — 1159F MED LIST DOCD IN RCRD: CPT | Performed by: INTERNAL MEDICINE

## 2025-05-05 PROCEDURE — 77080 DXA BONE DENSITY AXIAL: CPT | Performed by: INTERNAL MEDICINE

## 2025-05-05 PROCEDURE — 4004F PT TOBACCO SCREEN RCVD TLK: CPT | Performed by: INTERNAL MEDICINE

## 2025-05-05 PROCEDURE — 77080 DXA BONE DENSITY AXIAL: CPT

## 2025-05-05 PROCEDURE — G8420 CALC BMI NORM PARAMETERS: HCPCS | Performed by: INTERNAL MEDICINE

## 2025-05-05 PROCEDURE — 99214 OFFICE O/P EST MOD 30 MIN: CPT | Performed by: INTERNAL MEDICINE

## 2025-05-05 PROCEDURE — 96372 THER/PROPH/DIAG INJ SC/IM: CPT | Performed by: INTERNAL MEDICINE

## 2025-05-05 PROCEDURE — 1123F ACP DISCUSS/DSCN MKR DOCD: CPT | Performed by: INTERNAL MEDICINE

## 2025-05-05 NOTE — PROGRESS NOTES
Informed patient if any signs of redness,rash,swelling or unusual symptoms occur, please contact the office. Prolia given per physician order.  
Three finger spaces between ribs and pelvis.  Gait steady without assistance.   NEUROLOGICAL. Able to rise from chair without using arms. No apparent motor or sensory deficit. Coordination appears normal         BONE DENSITY.  Most recent done here using Hologic equipment.    T-scores   Initial study: 07/12/2002 L1-L2 -0.9   left fem. neck -0.7   Current study: 05/05/2025 L1-L2  0.0 L1 -1.2 left fem. neck -1.9     The table below shows bone mineral density (grams/cm2), the appropriate measure for comparing serial scans. An “increase” or “decrease” is significant based on precision studies done at our center according to the [facility] protocol. 63     PA spine Proximal Femur (left)   Date L1-L2 L1 Fem. neck Trochanter Total hip   07/12/2002 0.880 --- 0.773 0.703 0.992   06/18/2008 0.737 --- 0.698 0.642 0.877   02/16/2012 0.691 --- 0.662 0.559 0.813   11/25/2014 0.535 --- 0.645 0.517 0.762   05/10/2016 0.772 0.700 0.601 0.544 0.745   01/14/2019 0.833 0.692 0.625 0.557 0.747   01/21/2020 0.875 0.737 0.642 0.578 0.761   03/14/2023 0.916 0.826 0.651 0.548 0.728   04/04/2024 0.935 0.817 0.633 0.561 0.737   05/05/2025 0.984 0.860 0.642 0.565 0.745     IMPRESSION:  BONE DENSITY IS LOW, CONSISTENT WITH OSTEOPOROSIS.  SINCE THE LAST DXA, BMD INCREASED IN THE SPINE AND DID NOT CHANGE SIGNIFICANTLY IN THE LEFT HIP.   COMPARED WITH 2014, BEFORE STARTING PROLIA, BMD IS HIGHER NOW IN THE SPINE AND TROCHANTER.        Labs: 01/2015 CBC TSH Ca 9.3 Cr 0.6. 05/2015 UCa 76 (OK). 07/2017 Ca 9.2 Cr 0.5. 01/2020 Ca 9.4 Cr 0.5.  09/2020 Ca 10.3 Cr 0.7. 11/2022 Ca 9.4 Cr 0.7. 07/2023 Ca 9.6 Cr 0.7. 07/2024 Ca 9.3 Cr 0.6.  X-rays viewed: DXA printouts reviewed.  Radiology reports: 10/22/2014 LS MRI no fractures. 10/2017 CXR.    ASSESSMENT.  Osteoporosis, bone density lower than desirable and decreasing dramatically.  Without effective treatment she is at high risk of fracture.  She is doing well with Prolia started 05/2015.    PLANS. OK to give